# Patient Record
Sex: FEMALE | Race: WHITE | NOT HISPANIC OR LATINO | Employment: FULL TIME | URBAN - METROPOLITAN AREA
[De-identification: names, ages, dates, MRNs, and addresses within clinical notes are randomized per-mention and may not be internally consistent; named-entity substitution may affect disease eponyms.]

---

## 2018-08-01 ENCOUNTER — LAB VISIT (OUTPATIENT)
Dept: LAB | Facility: HOSPITAL | Age: 38
End: 2018-08-01
Attending: STUDENT IN AN ORGANIZED HEALTH CARE EDUCATION/TRAINING PROGRAM
Payer: COMMERCIAL

## 2018-08-01 ENCOUNTER — OFFICE VISIT (OUTPATIENT)
Dept: UROLOGY | Facility: CLINIC | Age: 38
End: 2018-08-01
Payer: COMMERCIAL

## 2018-08-01 VITALS
WEIGHT: 112 LBS | SYSTOLIC BLOOD PRESSURE: 119 MMHG | HEART RATE: 51 BPM | HEIGHT: 71 IN | BODY MASS INDEX: 15.68 KG/M2 | DIASTOLIC BLOOD PRESSURE: 73 MMHG

## 2018-08-01 DIAGNOSIS — R31.0 GROSS HEMATURIA: Primary | ICD-10-CM

## 2018-08-01 DIAGNOSIS — R31.0 GROSS HEMATURIA: ICD-10-CM

## 2018-08-01 DIAGNOSIS — N39.0 RECURRENT UTI: ICD-10-CM

## 2018-08-01 LAB
ANION GAP SERPL CALC-SCNC: 6 MMOL/L
BILIRUB UR QL STRIP: NEGATIVE
BUN SERPL-MCNC: 12 MG/DL
CALCIUM SERPL-MCNC: 9.6 MG/DL
CHLORIDE SERPL-SCNC: 104 MMOL/L
CLARITY UR REFRACT.AUTO: CLEAR
CO2 SERPL-SCNC: 29 MMOL/L
COLOR UR AUTO: NORMAL
CREAT SERPL-MCNC: 0.8 MG/DL
EST. GFR  (AFRICAN AMERICAN): >60 ML/MIN/1.73 M^2
EST. GFR  (NON AFRICAN AMERICAN): >60 ML/MIN/1.73 M^2
GLUCOSE SERPL-MCNC: 84 MG/DL
GLUCOSE UR QL STRIP: NEGATIVE
HGB UR QL STRIP: NEGATIVE
KETONES UR QL STRIP: NEGATIVE
LEUKOCYTE ESTERASE UR QL STRIP: NEGATIVE
MICROSCOPIC COMMENT: NORMAL
NITRITE UR QL STRIP: NEGATIVE
PH UR STRIP: 7 [PH] (ref 5–8)
POTASSIUM SERPL-SCNC: 3.9 MMOL/L
PROT UR QL STRIP: NEGATIVE
SODIUM SERPL-SCNC: 139 MMOL/L
SP GR UR STRIP: 1 (ref 1–1.03)
SQUAMOUS #/AREA URNS AUTO: 1 /HPF
URN SPEC COLLECT METH UR: NORMAL
UROBILINOGEN UR STRIP-ACNC: NEGATIVE EU/DL

## 2018-08-01 PROCEDURE — 99204 OFFICE O/P NEW MOD 45 MIN: CPT | Mod: 25,S$GLB,, | Performed by: STUDENT IN AN ORGANIZED HEALTH CARE EDUCATION/TRAINING PROGRAM

## 2018-08-01 PROCEDURE — 81001 URINALYSIS AUTO W/SCOPE: CPT

## 2018-08-01 PROCEDURE — 81002 URINALYSIS NONAUTO W/O SCOPE: CPT | Mod: S$GLB,,, | Performed by: STUDENT IN AN ORGANIZED HEALTH CARE EDUCATION/TRAINING PROGRAM

## 2018-08-01 PROCEDURE — 99999 PR PBB SHADOW E&M-NEW PATIENT-LVL IV: CPT | Mod: PBBFAC,,, | Performed by: STUDENT IN AN ORGANIZED HEALTH CARE EDUCATION/TRAINING PROGRAM

## 2018-08-01 PROCEDURE — 80048 BASIC METABOLIC PNL TOTAL CA: CPT

## 2018-08-01 PROCEDURE — 36415 COLL VENOUS BLD VENIPUNCTURE: CPT

## 2018-08-01 PROCEDURE — 87086 URINE CULTURE/COLONY COUNT: CPT

## 2018-08-01 PROCEDURE — 3008F BODY MASS INDEX DOCD: CPT | Mod: CPTII,S$GLB,, | Performed by: STUDENT IN AN ORGANIZED HEALTH CARE EDUCATION/TRAINING PROGRAM

## 2018-08-01 NOTE — PROGRESS NOTES
"Subjective:       Patient ID: Radha Oakes is a 37 y.o. female.    Chief Complaint: Urinary Tract Infection   This is a 37 y.o.  female patient that is new to me.  The patient is self referred to me for recurrent UTIs.  She notes that previously tristin was living in [place] for her job but did not get to see a physician for her symptoms. She notes she has been having about 1 UTI monthly since 3/2018. She determines that her UTI symptoms are gross hematuria, dysuria with passing clots, and sometimes these monthly episodes are associated with a "funny feeling" in her suprapubic area. She has never had symptoms like this before outside of 3/2018. She usually goes to her company medical office and she gets a urine dip checked, and is told she has a UTI. She is usually treated with 7-10 day courses of antibiotics, she recalls having macrobid and keflex. She feels that the UTI is resolved after these because she no longer experiences gross hematuria after she finishes her antibiotics. She notes she has a UTI after every encounter of sexual activity. However, she did state that 2 urine cultures in the past demonstrated no growth.      Day time urination patterns - she previously did not hydrate enough until March when her issues began.  Now she is more conscious about her hydration patterns.   She works for [employer] - she does have to travel for her job. She was based in [place] before.   Last menstrual cycle - regular  Bowel movements - regular    She works as a pharmaceutical rep    No results found for: CREATININE    I personally reviewed the images: none   ---  Past Medical History:   Diagnosis Date    Urinary tract infection        History reviewed. No pertinent surgical history.    Family History   Problem Relation Age of Onset    Prostate cancer Neg Hx     Kidney disease Neg Hx        Social History   Substance Use Topics    Smoking status: Never Smoker    Smokeless tobacco: Never Used    Alcohol use " No       No current outpatient prescriptions on file prior to visit.     No current facility-administered medications on file prior to visit.        Review of patient's allergies indicates:  No Known Allergies    Review of Systems   Constitutional: Negative for activity change.   HENT: Negative for congestion.    Eyes: Negative for visual disturbance.   Respiratory: Negative for shortness of breath.    Cardiovascular: Negative for chest pain.   Gastrointestinal: Negative for abdominal distention.   Musculoskeletal: Negative for gait problem.   Skin: Negative for color change.   Neurological: Negative for dizziness.   Psychiatric/Behavioral: Negative for agitation.       Objective:      Physical Exam   Constitutional: She is oriented to person, place, and time. She appears well-developed.   HENT:   Head: Normocephalic and atraumatic.   Eyes: EOM are normal.   Neck: Normal range of motion.   Cardiovascular: Intact distal pulses.    Pulmonary/Chest: Effort normal.   Musculoskeletal: Normal range of motion.   Neurological: She is alert and oriented to person, place, and time.   Skin: Skin is warm and dry.   Psychiatric: She has a normal mood and affect.       Assessment:       1. Gross hematuria    2. Recurrent UTI        Plan:       1. The patient has gross hematuria. I counseled the patient on the American Urology Guidelines for a hematuria workup.  The criteria for microscopic hematuria is 3 red blood cells on microscopic urinalysis and the workup is recommended for any patient experiencing gross hematuria. The workup includes a CT urogram and a flexible cystoscopy performed here in the clinic. After answering all of the questions about the workup the patient was amenable in proceeding.  2. She possibly has a history of recurrent UTIs (although 2 urine cultures in the past were reportedly no growth which was puzzling) that presented mostly with gross hematuria, dysuria with passing blood in the urine, and possible  suprapubic discomfort. I will send the urine for a UA and culture today as it did demonstrate trace blood in the urine on the dip.  2. Counseled on increasing hydration, avoiding postponing urination and tub soaks. Voiding pre and post sexual activity. Maintaining good hygiene. It is possible that if no other etiologies are determined, that the sx are due to recurrent post-coital UTI in which she would be a candidate for a post-coital antibiotic dose.    Gross hematuria  -     Basic metabolic panel; Future; Expected date: 08/01/2018  -     CT Urogram Abd Pelvis W WO; Future; Expected date: 08/01/2018  -     Cystoscopy; Future; Expected date: 08/01/2018  -     Urinalysis  -     Urinalysis Microscopic  -     Urine culture  -     POCT urine dipstick without microscope    Recurrent UTI

## 2018-08-02 LAB — BACTERIA UR CULT: NO GROWTH

## 2018-08-03 ENCOUNTER — HOSPITAL ENCOUNTER (OUTPATIENT)
Dept: RADIOLOGY | Facility: HOSPITAL | Age: 38
Discharge: HOME | End: 2018-08-03
Attending: STUDENT IN AN ORGANIZED HEALTH CARE EDUCATION/TRAINING PROGRAM
Payer: COMMERCIAL

## 2018-08-03 ENCOUNTER — TELEPHONE (OUTPATIENT)
Dept: UROLOGY | Facility: CLINIC | Age: 38
End: 2018-08-03

## 2018-08-03 DIAGNOSIS — R31.0 GROSS HEMATURIA: ICD-10-CM

## 2018-08-03 PROCEDURE — 74178 CT ABD&PLV WO CNTR FLWD CNTR: CPT | Mod: TC,PO

## 2018-08-03 PROCEDURE — 74178 CT ABD&PLV WO CNTR FLWD CNTR: CPT | Mod: 26,,, | Performed by: RADIOLOGY

## 2018-08-03 PROCEDURE — 25500020 PHARM REV CODE 255: Mod: PO | Performed by: STUDENT IN AN ORGANIZED HEALTH CARE EDUCATION/TRAINING PROGRAM

## 2018-08-03 RX ADMIN — IOHEXOL 120 ML: 350 INJECTION, SOLUTION INTRAVENOUS at 09:08

## 2018-08-03 NOTE — TELEPHONE ENCOUNTER
----- Message from Vanessa Banks MD sent at 8/3/2018  7:55 AM CDT -----  Please call patient and notify of negative urine culture. The urine culture that she gave us in clinic did not grow out bacteria in the urine concerning for a urinary tract infection (UTI), therefore no additional antibiotics are needed, if she is still taking some antibiotics from another prescriber she can finish that course.

## 2018-08-13 ENCOUNTER — PROCEDURE VISIT (OUTPATIENT)
Dept: UROLOGY | Facility: CLINIC | Age: 38
End: 2018-08-13
Payer: COMMERCIAL

## 2018-08-13 VITALS
SYSTOLIC BLOOD PRESSURE: 142 MMHG | BODY MASS INDEX: 19.32 KG/M2 | WEIGHT: 138 LBS | HEART RATE: 48 BPM | HEIGHT: 71 IN | DIASTOLIC BLOOD PRESSURE: 75 MMHG | TEMPERATURE: 98 F

## 2018-08-13 DIAGNOSIS — N39.0 RECURRENT UTI: ICD-10-CM

## 2018-08-13 DIAGNOSIS — R31.0 GROSS HEMATURIA: Primary | ICD-10-CM

## 2018-08-13 DIAGNOSIS — N39.0 URINARY TRACT INFECTION WITH HEMATURIA, SITE UNSPECIFIED: ICD-10-CM

## 2018-08-13 DIAGNOSIS — R31.9 URINARY TRACT INFECTION WITH HEMATURIA, SITE UNSPECIFIED: ICD-10-CM

## 2018-08-13 LAB
BILIRUB SERPL-MCNC: NORMAL MG/DL
BLOOD URINE, POC: NORMAL
COLOR, POC UA: YELLOW
GLUCOSE UR QL STRIP: NORMAL
KETONES UR QL STRIP: NORMAL
LEUKOCYTE ESTERASE URINE, POC: NORMAL
NITRITE, POC UA: NORMAL
PH, POC UA: 8
PROTEIN, POC: NORMAL
SPECIFIC GRAVITY, POC UA: 1
UROBILINOGEN, POC UA: NORMAL

## 2018-08-13 PROCEDURE — 81002 URINALYSIS NONAUTO W/O SCOPE: CPT | Mod: S$GLB,,, | Performed by: STUDENT IN AN ORGANIZED HEALTH CARE EDUCATION/TRAINING PROGRAM

## 2018-08-13 PROCEDURE — 52000 CYSTOURETHROSCOPY: CPT | Mod: S$GLB,,, | Performed by: STUDENT IN AN ORGANIZED HEALTH CARE EDUCATION/TRAINING PROGRAM

## 2018-08-13 RX ORDER — SULFAMETHOXAZOLE AND TRIMETHOPRIM 800; 160 MG/1; MG/1
1 TABLET ORAL 2 TIMES DAILY
Qty: 20 TABLET | Refills: 1 | Status: SHIPPED | OUTPATIENT
Start: 2018-08-13 | End: 2018-08-23

## 2018-08-13 NOTE — PROCEDURES
Procedure:   1. Flexible cysto-uretheroscopy    Pre Procedure Diagnosis:  1. Gross hematuria  2. Recurrent UTI's  3. Post-coital UTI's    Post Procedure Diagnosis:  Same    Surgeon: Vanessa Banks MD    Anesthesia: 2% uro-jet lidocaine jelly for local analgesia    Procedure note:  A flexible cysto-urethroscopy was performed after consent was obtained.  The risks and benefits were explained. 2% lidocaine urojet was used for local analgesia. The genitalia was prepped and draped in the sterile fashion.     The flexible scope was advanced into the urethra and into the bladder. The bilateral ureteral orifices were identified in their normal orthotopic locations. Clear bilateral ureteral efflux was identified. The bladder was completely surveyed in a systematic fashion. No bladder tumors or lesions were seen.     As the flexible cystoscope was being removed, the urethra was evaluated and noted to be normal.     The patient tolerated the procedure well without complication.       Findings in summary:  Normal bladder and urethral findings. No bladder tumors, calculi, or any other nidus of infection identified on cystoscopic evaluation.    I reviewed the CT urogram and showed the patient the images today that the patient underwent on 8/3/18-  The kidneys, renal collecting systems, and ureters are normal bilaterally.  No renal masses, calculi, or hydronephrosis are present on either side.  The urinary bladder appears normal.  1. Multiple splenic cysts.  2. Multilevel lumbar spine degenerative disc disease.    Assessment: 37 y.o. female with gross hematuria, recurrent UTI's that seem mostly to occur after sexual activity     Plan:  1. Cystoscopy evaluation demonstrated benign findings only.  2. CT demonstrated benign urologic findings. No kidney stones, no kidney masses or stones identified. Normal bilateral ureters, no evidence of hydroureter or hydronephrosis. CT did demonstrate incidental small splenic cysts likely of no  significant etiology as well as significant spinal findings which I counseled the patient she may benefit from an orthopedic evaluation.  3. Recurrent UTI's - they seem to have a temporal relation to sexual activity. Reviewed what we discussed at our last visit - increasing hydration, voiding pre and post sexual activity (patient is already employing these measures).  4. She will try probiotics to prevent UTI's.  5. I also prescribed bactrim for post-sexual activity UTI prophylaxis which the patient has instructions on how to take.  6. Should she develop UTI symptoms she will call my office and I will order urine culture and UA and call in antibiotics.

## 2018-08-13 NOTE — PATIENT INSTRUCTIONS
1. Probiotics - try culturelle  2. Establish with ob/gyn and primary care  3. If you ever develop UTI symptoms, call my office I will put in urine culture orders.  4. For UTIs post-sexual activity protection, take 1 tablet of the bactrim after sexual activity. Urinate/void before and after sexual activity.

## 2021-05-06 ENCOUNTER — PATIENT MESSAGE (OUTPATIENT)
Dept: RESEARCH | Facility: HOSPITAL | Age: 41
End: 2021-05-06

## 2022-04-06 ENCOUNTER — TELEPHONE (OUTPATIENT)
Dept: UROLOGY | Facility: CLINIC | Age: 42
End: 2022-04-06
Payer: COMMERCIAL

## 2022-04-06 NOTE — TELEPHONE ENCOUNTER
Spoke with patient, informed abdominal/pelvic ultrasound, labs, urine was scanned into chart.  She voiced understanding.

## 2022-04-06 NOTE — TELEPHONE ENCOUNTER
----- Message from Antonietta Mcfarland sent at 4/6/2022  2:09 PM CDT -----  Type:  Needs Medical Advice    Who Called: self  Reason:questions regarding appointment tomorrow. Did you receive her records from her PCP?  Would the patient rather a call back or a response via [facility]?call  Best Call Back Number: [phone]  Additional Information: none

## 2022-04-07 ENCOUNTER — OFFICE VISIT (OUTPATIENT)
Dept: UROLOGY | Facility: CLINIC | Age: 42
End: 2022-04-07
Payer: COMMERCIAL

## 2022-04-07 VITALS
HEART RATE: 57 BPM | DIASTOLIC BLOOD PRESSURE: 81 MMHG | SYSTOLIC BLOOD PRESSURE: 135 MMHG | WEIGHT: 133.69 LBS | BODY MASS INDEX: 18.72 KG/M2 | HEIGHT: 71 IN

## 2022-04-07 DIAGNOSIS — R31.29 MICROSCOPIC HEMATURIA: Primary | ICD-10-CM

## 2022-04-07 PROCEDURE — 1159F PR MEDICATION LIST DOCUMENTED IN MEDICAL RECORD: ICD-10-PCS | Mod: CPTII,S$GLB,, | Performed by: STUDENT IN AN ORGANIZED HEALTH CARE EDUCATION/TRAINING PROGRAM

## 2022-04-07 PROCEDURE — 99999 PR PBB SHADOW E&M-EST. PATIENT-LVL III: ICD-10-PCS | Mod: PBBFAC,,, | Performed by: STUDENT IN AN ORGANIZED HEALTH CARE EDUCATION/TRAINING PROGRAM

## 2022-04-07 PROCEDURE — 3075F PR MOST RECENT SYSTOLIC BLOOD PRESS GE 130-139MM HG: ICD-10-PCS | Mod: CPTII,S$GLB,, | Performed by: STUDENT IN AN ORGANIZED HEALTH CARE EDUCATION/TRAINING PROGRAM

## 2022-04-07 PROCEDURE — 1159F MED LIST DOCD IN RCRD: CPT | Mod: CPTII,S$GLB,, | Performed by: STUDENT IN AN ORGANIZED HEALTH CARE EDUCATION/TRAINING PROGRAM

## 2022-04-07 PROCEDURE — 1160F PR REVIEW ALL MEDS BY PRESCRIBER/CLIN PHARMACIST DOCUMENTED: ICD-10-PCS | Mod: CPTII,S$GLB,, | Performed by: STUDENT IN AN ORGANIZED HEALTH CARE EDUCATION/TRAINING PROGRAM

## 2022-04-07 PROCEDURE — 99999 PR PBB SHADOW E&M-EST. PATIENT-LVL III: CPT | Mod: PBBFAC,,, | Performed by: STUDENT IN AN ORGANIZED HEALTH CARE EDUCATION/TRAINING PROGRAM

## 2022-04-07 PROCEDURE — 3075F SYST BP GE 130 - 139MM HG: CPT | Mod: CPTII,S$GLB,, | Performed by: STUDENT IN AN ORGANIZED HEALTH CARE EDUCATION/TRAINING PROGRAM

## 2022-04-07 PROCEDURE — 3079F PR MOST RECENT DIASTOLIC BLOOD PRESSURE 80-89 MM HG: ICD-10-PCS | Mod: CPTII,S$GLB,, | Performed by: STUDENT IN AN ORGANIZED HEALTH CARE EDUCATION/TRAINING PROGRAM

## 2022-04-07 PROCEDURE — 3079F DIAST BP 80-89 MM HG: CPT | Mod: CPTII,S$GLB,, | Performed by: STUDENT IN AN ORGANIZED HEALTH CARE EDUCATION/TRAINING PROGRAM

## 2022-04-07 PROCEDURE — 1160F RVW MEDS BY RX/DR IN RCRD: CPT | Mod: CPTII,S$GLB,, | Performed by: STUDENT IN AN ORGANIZED HEALTH CARE EDUCATION/TRAINING PROGRAM

## 2022-04-07 PROCEDURE — 99204 PR OFFICE/OUTPT VISIT, NEW, LEVL IV, 45-59 MIN: ICD-10-PCS | Mod: S$GLB,,, | Performed by: STUDENT IN AN ORGANIZED HEALTH CARE EDUCATION/TRAINING PROGRAM

## 2022-04-07 PROCEDURE — 3008F PR BODY MASS INDEX (BMI) DOCUMENTED: ICD-10-PCS | Mod: CPTII,S$GLB,, | Performed by: STUDENT IN AN ORGANIZED HEALTH CARE EDUCATION/TRAINING PROGRAM

## 2022-04-07 PROCEDURE — 99204 OFFICE O/P NEW MOD 45 MIN: CPT | Mod: S$GLB,,, | Performed by: STUDENT IN AN ORGANIZED HEALTH CARE EDUCATION/TRAINING PROGRAM

## 2022-04-07 PROCEDURE — 3008F BODY MASS INDEX DOCD: CPT | Mod: CPTII,S$GLB,, | Performed by: STUDENT IN AN ORGANIZED HEALTH CARE EDUCATION/TRAINING PROGRAM

## 2022-04-07 RX ORDER — PREDNISONE 20 MG/1
TABLET ORAL
COMMUNITY
End: 2022-04-14

## 2022-04-07 RX ORDER — IBUPROFEN AND FAMOTIDINE 26.6; 8 MG/1; MG/1
TABLET ORAL
COMMUNITY

## 2022-04-07 RX ORDER — NORETHINDRONE ACETATE AND ETHINYL ESTRADIOL, ETHINYL ESTRADIOL AND FERROUS FUMARATE 1MG-10(24)
1 KIT ORAL DAILY
COMMUNITY
Start: 2022-03-14 | End: 2024-01-11

## 2022-04-07 NOTE — PROGRESS NOTES
Subjective:       Patient ID: Radha Oakes is a 41 y.o. female.    Chief Complaint: Hematuria  This is a 41 y.o.  female patient that is an established patient of mine.  The patient has seen me before in 2018 for history of recurrent UTI's and underwent a gross hematuria workup. CT urogram and cystoscopy performed in 2018 were benign. 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    4/7/22  Re-referred by PCP. Urinalysis showed trace blood on dip but microscopic analysis of RBCs demonstrated no red blood cells seen. Urine culture - no growth. Cr - 1.00  Abdominal US 3/4/22 - no stones or hydro seen (report only)      Lab Results   Component Value Date    CREATININE 0.8 08/01/2018       ---  Past Medical History:   Diagnosis Date    Urinary tract infection        Past Surgical History:   Procedure Laterality Date    AUGMENTATION OF BREAST Bilateral     approx 2011       Family History   Problem Relation Age of Onset    Prostate cancer Neg Hx     Kidney disease Neg Hx        Social History     Tobacco Use    Smoking status: Never Smoker    Smokeless tobacco: Never Used   Substance Use Topics    Alcohol use: No    Drug use: No       Current Outpatient Medications on File Prior to Visit   Medication Sig Dispense Refill    ibuprofen-famotidine (DUEXIS) 800-26.6 mg Tab Duexis 800 mg-26.6 mg tablet      LO LOESTRIN FE 1 mg-10 mcg (24)/10 mcg (2) Tab Take 1 tablet by mouth once daily.      predniSONE (DELTASONE) 20 MG tablet prednisone 20 mg tablet      [DISCONTINUED] prenatal71-iron-folic acid-dha 30-1.4-200 mg CpID Take 1 capsule by mouth once daily.       No current facility-administered medications on file prior to visit.       Review of patient's allergies indicates:  No Known Allergies    Review of Systems   Constitutional: Negative for activity change.   HENT: Negative for congestion.    Eyes: Negative for visual disturbance.   Respiratory: Negative for shortness of breath.    Cardiovascular:  "Negative for chest pain.   Gastrointestinal: Negative for abdominal distention.   Musculoskeletal: Negative for gait problem.   Skin: Negative for color change.   Neurological: Negative for dizziness.   Psychiatric/Behavioral: Negative for agitation.       Objective:      Physical Exam  Constitutional:       Appearance: She is well-developed.   HENT:      Head: Normocephalic and atraumatic.   Pulmonary:      Effort: Pulmonary effort is normal.   Musculoskeletal:         General: Normal range of motion.      Cervical back: Normal range of motion.   Skin:     General: Skin is warm and dry.   Neurological:      Mental Status: She is alert and oriented to person, place, and time.         Assessment:       1. Microscopic hematuria        Plan:         1. Reviewed outside records and showed them to patient - I have 1 urinalysis result from her pcp's office - occult blood trace but on microscopic analysis - RBCs reported as "none seen"  2. Therefore reassured pt that she does not have any microscopic hematuria seen on UA. Not only that 0-2 RBCs on microscopic analysis are within normal limits.      Microscopic hematuria      "

## 2023-12-28 ENCOUNTER — TELEPHONE (OUTPATIENT)
Dept: GASTROENTEROLOGY | Facility: CLINIC | Age: 43
End: 2023-12-28
Payer: COMMERCIAL

## 2023-12-28 NOTE — TELEPHONE ENCOUNTER
----- Message from Mandi Mendoza LPN sent at 12/27/2023  2:57 PM CST -----  Contact: Patient    ----- Message -----  From: Tila Myers  Sent: 12/27/2023   2:29 PM CST  To: [facility] Clinical Staff    Type:  Patient Returning Call    Who Called:  Patient  Who Left Message for Patient:  Cindy  Does the patient know what this is regarding?:  Schedule colonoscopy    Would the patient rather a call back or a response via [facility]?   Call back  Best Call Back Number:  [phone]    Additional Information:   States she is returning a missed call to schedule a colonoscopy - please call - thank you

## 2024-01-11 PROBLEM — D70.9 NEUTROPENIA, UNSPECIFIED TYPE: Status: ACTIVE | Noted: 2024-01-11

## 2024-01-22 ENCOUNTER — TELEPHONE (OUTPATIENT)
Dept: GASTROENTEROLOGY | Facility: CLINIC | Age: 44
End: 2024-01-22
Payer: COMMERCIAL

## 2024-01-22 NOTE — TELEPHONE ENCOUNTER
Diagnosis is confirmed from the case order.   BMI: 19.30  First Colonoscopy? yes  Family history of colon cancer? no  Medical issues? no  Blood thinners? no  Preferred day: M or Friday.    I instruct Ms. Garcia to have a someone she knows (no Uber or taxi) to drive her home after the procedure since she'll be sedated.  I also inform pt the exact arrival time will be provided to her by the surgery center a couple of days to the afternoon prior to the procedure date.  Inform pt I will send prep instructions via [employer].   Pt verbalizes understanding to the statements above.

## 2024-01-22 NOTE — TELEPHONE ENCOUNTER
----- Message from Mandi Mendoza LPN sent at 1/22/2024  9:02 AM CST -----  Contact: Pt    ----- Message -----  From: Margo Denton, Patient Care Assistant  Sent: 1/22/2024   7:54 AM CST  To: [facility] Clinical Staff    Type: Return Call    Who Called: Pt  Who Left Message for Pt: Alejandra  Does the patient know what this is regarding: Yes/Procedure Date  Best Call Back Number: [phone]  Thank you~

## 2024-01-22 NOTE — TELEPHONE ENCOUNTER
----- Message from Mandi Mendoza LPN sent at 1/22/2024 11:47 AM CST -----  Contact: self    ----- Message -----  From: Eloina Fleming  Sent: 1/22/2024  11:45 AM CST  To: [facility] Staff    Type:  Patient Returning Call  Who Called:Pt  Who Left Message for Patient: Alejandra JONATHAN   Does the patient know what this is regarding?: Screening for colon cancer  Best Call Back Number: [phone]  Please call pt between 1 and 1:30 or after 4 pm to schedule... Thank you...

## 2024-02-14 ENCOUNTER — TELEPHONE (OUTPATIENT)
Dept: UROLOGY | Facility: CLINIC | Age: 44
End: 2024-02-14
Payer: COMMERCIAL

## 2024-02-26 ENCOUNTER — TELEPHONE (OUTPATIENT)
Dept: GASTROENTEROLOGY | Facility: CLINIC | Age: 44
End: 2024-02-26
Payer: COMMERCIAL

## 2024-02-26 ENCOUNTER — TELEPHONE (OUTPATIENT)
Dept: SURGERY | Facility: CLINIC | Age: 44
End: 2024-02-26
Payer: COMMERCIAL

## 2024-02-26 NOTE — TELEPHONE ENCOUNTER
----- Message from Carli Liu sent at 2/26/2024  3:25 PM CST -----  Type: Needs Medical Advice  Who Called:  pt  Symptoms (please be specific):  pt said she need to reschedule her procedure w/ the provider--she will be traveling the date it's scheduled for--please call and advise  Best Call Back Number: [phone] (home)     Additional Information: thank you

## 2024-02-26 NOTE — TELEPHONE ENCOUNTER
----- Message from Mandi Mendoza LPN sent at 2/26/2024  3:48 PM CST -----    ----- Message -----  From: Issa Cantrell LPN  Sent: 2/26/2024   3:47 PM CST  To: [facility] Clinical Staff      ----- Message -----  From: Carli Liu  Sent: 2/26/2024   3:26 PM CST  To: [facility] Staff    Type: Needs Medical Advice  Who Called:  pt  Symptoms (please be specific):  pt said she need to reschedule her procedure w/ the provider--she will be traveling the date it's scheduled for--please call and advise  Best Call Back Number: [phone] (home)     Additional Information: thank you

## 2024-02-26 NOTE — TELEPHONE ENCOUNTER
Ms. Oakes is going to have surgery in April and will be travelling to [place] from May to November. I will keep her MRN in case of any cancellations for the week of 3/18.

## 2024-03-06 ENCOUNTER — TELEPHONE (OUTPATIENT)
Dept: GASTROENTEROLOGY | Facility: CLINIC | Age: 44
End: 2024-03-06
Payer: COMMERCIAL